# Patient Record
Sex: MALE | Race: BLACK OR AFRICAN AMERICAN
[De-identification: names, ages, dates, MRNs, and addresses within clinical notes are randomized per-mention and may not be internally consistent; named-entity substitution may affect disease eponyms.]

---
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
## 2017-03-08 NOTE — DIAGNOSTIC IMAGING REPORT
Indication: Pain



3 views of the left knee were obtained.



Findings:  There is joint space narrowing with marginal osteophyte formation 

and

subchondral sclerosis. Joint effusion is present. No fracture is identified.



Impression:



Joint effusion



Osteoarthritis

## 2017-03-08 NOTE — DIAGNOSTIC IMAGING REPORT
Indication: Chest Pain



Comparison:  2/21/17



A single view chest radiograph was obtained.



Findings:



Cardiomediastinal appearance is within normal limits for age.  Aorta is 

ectatic

consistent with atherosclerotic disease. Pulmonary vascularity is appropriate. 

The

diaphragmatic contour is smooth and costophrenic angles are sharp. No pleural

effusions are identified. The bones are unremarkable.



Impression: No acute findings

## 2017-03-11 NOTE — CARDIOLOGY REPORT
--------------- APPROVED REPORT --------------





EKG Measurement

Heart [id number]

MD [id number]

[id number]

[id number]

[id number]





Normal sinus rhythm

Normal ECG

## 2017-06-12 NOTE — EMERGENCY ROOM REPORT
History of Present Illness


General


Chief Complaint:  Hypertension


Source:  Patient





Present Illness


HPI


The patient is a 62-year-old male with history of hypertension presenting for 

high blood pressure.  He states that he took his blood pressure today which was 

1702/110s.  He states that he has been trying to reduce his blood pressure 

using supplements and has not been taking medications as prescribed.  He 

usually takes hydrochlorothiazide and amlodipine daily.  He did take his blood 

pressure medications today after seeing it was elevated.  He denies any other 

symptoms including N, V, F, chills, CP, SOB, dizziness, blurred vision, neck 

pain, abd pain


Allergies:  


Coded Allergies:  


     ATENOLOL (Verified  Allergy, Unknown, 2/21/17)


     LISINOPRIL (Verified  Allergy, Unknown, Itching, 9/13/12)





Patient History


Past Medical History:  see triage record


Pertinent Family History:  none


Reviewed Nursing Documentation:  PMH: Agreed, PSxH: Agreed





Nursing Documentation-PMH


Hx Cardiac Problems:  No


Hx Hypertension:  Yes


Hx Pacemaker:  No


Hx Asthma:  No


Hx COPD:  No


Hx Diabetes:  Yes - "borderline"


Hx Cancer:  No


Hx Gastrointestinal Problems:  No


Hx Dialysis:  No


Hx Neurological Problems:  No


Hx Cerebrovascular Accident:  No


Hx Seizures:  No





Review of Systems


All Other Systems:  negative except mentioned in HPI





Physical Exam





Vital Signs








  Date Time  Temp Pulse Resp B/P Pulse Ox O2 Delivery O2 Flow Rate FiO2


 


6/12/17 16:31 97.5 78 15 152/101 95 Room Air  








Sp02 EP Interpretation:  reviewed, normal


General Appearance:  no apparent distress, alert, GCS 15, non-toxic


Head:  normocephalic, atraumatic


Eyes:  bilateral eye PERRL, bilateral eye normal inspection


ENT:  hearing grossly normal, normal pharynx, no angioedema, normal voice


Neck:  full range of motion, supple/symm/no masses


Respiratory:  chest non-tender, lungs clear, normal breath sounds, speaking 

full sentences


Cardiovascular #1:  regular rate, rhythm, no edema


Musculoskeletal:  back normal, gait/station normal, normal range of motion, non-

tender


Neurologic:  alert, oriented x3, responsive, motor strength/tone normal, 

sensory intact, speech normal


Psychiatric:  judgement/insight normal, memory normal, mood/affect normal, no 

suicidal/homicidal ideation


Skin:  normal color, no rash, warm/dry, well hydrated





Medical Decision Making


PA Attestation


Dr. Parry is my supervising physician. Patient management was discussed with 

my supervising physician


Diagnostic Impression:  


 Primary Impression:  


 Hypertension


 Qualified Codes:  I10 - Essential (primary) hypertension


ER Course


The patient is a 62-year-old male presenting for hypertension





Differential diagnoses considered but not limited to: Hypertension, ACS, anxiety

, among others





Physical exam: The patient is hypertensive.  No apparent distress.


PERRL. EOMI. 


Normal mentation. 


RRR. No MRG


Lungs CTA bilat


Abdomen: Normal appearance. Non distended. No ecchymosis. 


Normal BS. Non TTP. No McBurney point tenderness. No guarding. 


Skin is warm and dry, no rashes. 





Blood work is unremarkable including cardiac markers


EKG unremarkable


Chest x-ray unremarkable





The patient is given clonidine and blood pressure has reduced





Patient is discharged home and is told to record blood pressure at home and 

take it to his primary doctor.  He will followup with his PMD.


ER precautions given





Laboratory Tests








Test


  6/12/17


17:05


 


White Blood Count


  6.6 K/UL


(4.8-10.8)


 


Red Blood Count


  4.37 M/UL


(4.70-6.10)  L


 


Hemoglobin


  14.1 G/DL


(14.2-18.0)  L


 


Hematocrit


  43.5 %


(42.0-52.0)


 


Mean Corpuscular Volume


  100 FL (80-99)


H


 


Mean Corpuscular Hemoglobin


  32.2 PG


(27.0-31.0)  H


 


Mean Corpuscular Hemoglobin


Concent 32.4 G/DL


(32.0-36.0)


 


Red Cell Distribution Width


  10.5 %


(11.6-14.8)  L


 


Platelet Count


  157 K/UL


(150-450)


 


Mean Platelet Volume


  8.3 FL


(6.5-10.1)


 


Neutrophils (%) (Auto)


  44.7 %


(45.0-75.0)  L


 


Lymphocytes (%) (Auto)


  39.4 %


(20.0-45.0)


 


Monocytes (%) (Auto)


  8.5 %


(1.0-10.0)


 


Eosinophils (%) (Auto)


  6.3 %


(0.0-3.0)  H


 


Basophils (%) (Auto)


  1.1 %


(0.0-2.0)


 


Sodium Level


  136 mEQ/L


(135-145)


 


Potassium Level


  3.9 mEQ/L


(3.4-4.9)


 


Chloride Level


  97 mEQ/L


()  L


 


Carbon Dioxide Level


  26 mEQ/L


(20-30)


 


Anion Gap 13 (5-15)  


 


Blood Urea Nitrogen


  17 mg/dL


(7-23)


 


Creatinine


  1.2 mg/dL


(0.7-1.2)


 


Estimate Glomerular


Filtration Rate > 60 mL/min


(>60)


 


Glucose Level


  119 mg/dL


()  H


 


Calcium Level


  10.7 mg/dL


(8.6-10.2)  H


 


Total Bilirubin


  0.4 mg/dL


(0.0-1.2)


 


Aspartate Amino Transferase


(AST) 20 U/L (5-40)  


 


 


Alanine Aminotransferase (ALT) 15 U/L (3-41)  


 


Alkaline Phosphatase


  93 U/L


()


 


Total Creatine Kinase


  200 U/L


()  H


 


Creatine Kinase MB


  3.2 ng/mL (<


6.7)


 


Creatine Kinase MB Relative


Index 1.6  


 


 


Troponin I


  < 0.30 ng/mL


(<=0.30)


 


Total Protein


  7.0 g/dL


(6.6-8.7)


 


Albumin


  3.9 g/dL


(3.5-5.2)


 


Globulin 3.1 g/dL  


 


Albumin/Globulin Ratio 1.2 (1.0-2.7)  








Lab Results Impression


Unremarkable


EKG Diagnostic Results


EP Interpretation:  NSR. Normal EKG


Rate:  normal - 74


Rhythm:  NSR


ST Segments:  no acute changes


ASA given to the pt in ED:  No


PA Scribe Text


EKG was reviewed and read with my supervising physician. No acute ST segment 

changes are seen. Normal rate and rhythm. No acute changes.





Chest X-Ray Diagnostic Results


Chest X-Ray Ordered:  Yes


# of Views/Limited/Complete:  1 View


Interpretation:  no consolidation, no effusion, no pneumothorax, no acute 

cardiopulmonary disease


Indication:  Other - HTN


Impression:  No acute disease


Date Electronically Signed:  Jun 12, 2017


Time Electronically Signed:  22:04


Interpreting ER Physician:  Dr. Brinda BLOCK Scribe Text


I am acting as scribe for my supervising physician. My supervising physician's 

interpretation of the chest xrays are there is no consolidation, no effusion, 

no acute cardiopulmonary disease, no pneumothorax





Last Vital Signs








  Date Time  Temp Pulse Resp B/P Pulse Ox O2 Delivery O2 Flow Rate FiO2


 


6/12/17 16:31 97.5 78 15 152/101 95 Room Air  








Status:  improved


Disposition:  HOME, SELF-CARE


Condition:  Improved


Scripts


Hydrochlorothiazide* (HYDROCHLOROTHIAZIDE*) 25 Mg Tablet


25 MG ORAL DAILY, #15 TAB


   Prov: ENRIQUE BROCK         6/12/17 


Amlodipine Besylate* (AMLODIPINE BESYLATE*) 10 Mg Tablet


10 MG ORAL DAILY, #15 TAB


   Prov: ENRIQUE BROCK         6/12/17











ENRIQUE BROCK Jun 12, 2017 16:46

## 2017-06-13 NOTE — DIAGNOSTIC IMAGING REPORT
Indications:  Shortness of breath



Technique:  Portable AP chest



Findings:



Comparison:  3/8/2017



Cardiac silhouette remains normal in size. Pulmonary vasculature remains within

normal limits. Lungs and pleura remain clear. Mild calcification and ectasia,

elongation of the aortic arch again noted.



IMPRESSION:



No evidence of acute disease, unchanged



Stable chronic changes as described

## 2017-06-14 NOTE — CARDIOLOGY REPORT
--------------- APPROVED REPORT --------------





EKG Measurement

Heart [id number]

MI [id number]

[id number]

[id number]

[id number]





Normal sinus rhythm

Normal ECG

## 2018-05-19 NOTE — EMERGENCY ROOM REPORT
History of Present Illness


General


Chief Complaint:  Male Urogenital Problems


Source:  Patient





Present Illness


HPI


Patient is a 63-year-old male who presented after increased hematuria.  The 

patient per having symptoms for the past few days associated with increased 

urinary frequency the patient denies any fever or flank pain.  The patient 

reports having some discoloration of his urine which was intermittent in 

nature.  He denies any prior history of renal stones.  The patient denies 

hesitancy with urination.Patient denies taking blood thinners or aspirin.


Allergies:  


Coded Allergies:  


     ATENOLOL (Verified  Allergy, Unknown, 2/21/17)


     LISINOPRIL (Verified  Allergy, Unknown, Itching, 9/13/12)





Patient History


Past Medical History:  see triage record


Reviewed Nursing Documentation:  PMH: Agreed; PSxH: Agreed





Nursing Documentation-PMH


Hx Cardiac Problems:  No


Hx Hypertension:  Yes


Hx Pacemaker:  No


Hx Asthma:  No


Hx COPD:  No


Hx Diabetes:  Yes - "borderline"


Hx Cancer:  No


Hx Gastrointestinal Problems:  No


Hx Dialysis:  No


Hx Neurological Problems:  No


Hx Cerebrovascular Accident:  No


Hx Seizures:  No





Review of Systems


All Other Systems:  negative except mentioned in HPI





Physical Exam





Vital Signs








  Date Time  Temp Pulse Resp B/P (MAP) Pulse Ox O2 Delivery O2 Flow Rate FiO2


 


5/19/18 16:24 98.6 98 20 152/96 99 Room Air  





 98.6       








Sp02 EP Interpretation:  reviewed, normal


General Appearance:  normal inspection, well appearing, no apparent distress, 

alert, GCS 15


Head:  atraumatic


ENT:  normal ENT inspection, hearing grossly normal, normal voice


Neck:  normal inspection, full range of motion, supple, no bony tend


Respiratory:  normal inspection, lungs clear, normal breath sounds, no 

respiratory distress, no retraction, no wheezing


Cardiovascular #1:  regular rate, rhythm, no edema


Gastrointestinal:  normal inspection, normal bowel sounds, non tender, soft, no 

guarding, no hernia


Genitourinary:  no CVA tenderness


Musculoskeletal:  normal inspection, back normal, normal range of motion


Neurologic:  normal inspection, alert, oriented x3, responsive, CNs III-XII nml 

as tested, speech normal


Psychiatric:  normal inspection, judgement/insight normal, mood/affect normal


Skin:  normal inspection, normal color, no rash





Medical Decision Making


Diagnostic Impression:  


 Primary Impression:  


 Urinary tract infection


ER Course


The patient presented for hematuria.  Differential diagnosis included was not 

limited to urinary tract infection, renal cell carcinoma, bladder CA 

pyelonephritis among others.The urinalysis showed evidence of a mild urinary 

infection.  Patient was given prescription for oral antibiotics as well as 

Pyridium.  The patient is advised to follow-up with urology for further 

evaluation of hematuria.The patient is advised to follow up with  primary care 

doctor in 1-2 days.  Patient is advised to return if any worsening condition or 

if any changes in status that are concerning.





This report is dictated with Dragon transcription software which may 

occasionally lead to discrepancies related to use of this software.





Labs








Test


  5/19/18


16:30


 


Urine Color Yellow 


 


Urine Appearance Clear 


 


Urine pH 7 (4.5-8.0) 


 


Urine Specific Gravity


  1.010


(1.005-1.035)


 


Urine Protein 1+ (NEGATIVE) 


 


Urine Glucose (UA)


  Negative


(NEGATIVE)


 


Urine Ketones


  Negative


(NEGATIVE)


 


Urine Occult Blood 5+ (NEGATIVE) 


 


Urine Nitrite


  Negative


(NEGATIVE)


 


Urine Bilirubin


  Negative


(NEGATIVE)


 


Urine Urobilinogen


  Normal MG/DL


(0.0-1.0)


 


Urine Leukocyte Esterase 1+ (NEGATIVE) 


 


Urine RBC


  5-10 /HPF (0 -


0)


 


Urine WBC


  2-4 /HPF (0 -


0)


 


Urine Squamous Epithelial


Cells None /LPF


(NONE/OCC)


 


Urine Bacteria


  Few /HPF


(NONE)











Last Vital Signs








  Date Time  Temp Pulse Resp B/P (MAP) Pulse Ox O2 Delivery O2 Flow Rate FiO2


 


5/19/18 16:39 98.6 98 20 152/96 99 Room Air  





 98.6       








Status:  improved


Disposition:  HOME, SELF-CARE


Condition:  Stable


Scripts


Phenazopyridine Hcl* (PYRIDIUM*) 200 Mg Tablet


200 MG ORAL THREE TIMES A DAY, #14 TAB 0 Refills


   Prov: Josh Leon MD         5/19/18 


Cephalexin* (KEFLEX*) 500 Mg Capsule


500 MG ORAL EVERY 6 HOURS, #28 CAP


   Prov: Josh Leon MD         5/19/18


Referrals:  


[facility],REFERRING (PCP)











Josh Leon MD May 19, 2018 17:05

## 2018-08-15 ENCOUNTER — HOSPITAL ENCOUNTER (EMERGENCY)
Dept: HOSPITAL 72 - EMR | Age: 63
Discharge: HOME | End: 2018-08-15
Payer: SELF-PAY

## 2018-08-15 VITALS — DIASTOLIC BLOOD PRESSURE: 105 MMHG | SYSTOLIC BLOOD PRESSURE: 164 MMHG

## 2018-08-15 VITALS — HEIGHT: 72 IN | BODY MASS INDEX: 32.51 KG/M2 | WEIGHT: 240 LBS

## 2018-08-15 VITALS — SYSTOLIC BLOOD PRESSURE: 144 MMHG | DIASTOLIC BLOOD PRESSURE: 99 MMHG

## 2018-08-15 DIAGNOSIS — R10.32: Primary | ICD-10-CM

## 2018-08-15 DIAGNOSIS — I10: ICD-10-CM

## 2018-08-15 DIAGNOSIS — Z88.8: ICD-10-CM

## 2018-08-15 DIAGNOSIS — R73.03: ICD-10-CM

## 2018-08-15 LAB
ADD MANUAL DIFF: NO
ALBUMIN SERPL-MCNC: 3.4 G/DL (ref 3.4–5)
ALBUMIN/GLOB SERPL: 0.9 {RATIO} (ref 1–2.7)
ALP SERPL-CCNC: 103 U/L (ref 46–116)
ALT SERPL-CCNC: 28 U/L (ref 12–78)
ANION GAP SERPL CALC-SCNC: 6 MMOL/L (ref 5–15)
APPEARANCE UR: CLEAR
APTT PPP: YELLOW S
AST SERPL-CCNC: 19 U/L (ref 15–37)
BASOPHILS NFR BLD AUTO: 1.5 % (ref 0–2)
BILIRUB SERPL-MCNC: 0.6 MG/DL (ref 0.2–1)
BUN SERPL-MCNC: 16 MG/DL (ref 7–18)
CALCIUM SERPL-MCNC: 10.6 MG/DL (ref 8.5–10.1)
CHLORIDE SERPL-SCNC: 106 MMOL/L (ref 98–107)
CO2 SERPL-SCNC: 28 MMOL/L (ref 21–32)
CREAT SERPL-MCNC: 1.4 MG/DL (ref 0.55–1.3)
EOSINOPHIL NFR BLD AUTO: 5.5 % (ref 0–3)
ERYTHROCYTE [DISTWIDTH] IN BLOOD BY AUTOMATED COUNT: 10.6 % (ref 11.6–14.8)
GLOBULIN SER-MCNC: 3.8 G/DL
GLUCOSE UR STRIP-MCNC: NEGATIVE MG/DL
HCT VFR BLD CALC: 41.2 % (ref 42–52)
HGB BLD-MCNC: 14.4 G/DL (ref 14.2–18)
KETONES UR QL STRIP: NEGATIVE
LEUKOCYTE ESTERASE UR QL STRIP: (no result)
LYMPHOCYTES NFR BLD AUTO: 41.8 % (ref 20–45)
MCV RBC AUTO: 97 FL (ref 80–99)
MONOCYTES NFR BLD AUTO: 6.4 % (ref 1–10)
NEUTROPHILS NFR BLD AUTO: 44.8 % (ref 45–75)
NITRITE UR QL STRIP: NEGATIVE
PH UR STRIP: 5 [PH] (ref 4.5–8)
PLATELET # BLD: 164 K/UL (ref 150–450)
POTASSIUM SERPL-SCNC: 4.2 MMOL/L (ref 3.5–5.1)
PROT UR QL STRIP: NEGATIVE
RBC # BLD AUTO: 4.23 M/UL (ref 4.7–6.1)
SODIUM SERPL-SCNC: 140 MMOL/L (ref 136–145)
SP GR UR STRIP: 1.02 (ref 1–1.03)
UROBILINOGEN UR-MCNC: NORMAL MG/DL (ref 0–1)
WBC # BLD AUTO: 6.9 K/UL (ref 4.8–10.8)

## 2018-08-15 PROCEDURE — 85025 COMPLETE CBC W/AUTO DIFF WBC: CPT

## 2018-08-15 PROCEDURE — 81003 URINALYSIS AUTO W/O SCOPE: CPT

## 2018-08-15 PROCEDURE — 36415 COLL VENOUS BLD VENIPUNCTURE: CPT

## 2018-08-15 PROCEDURE — 80053 COMPREHEN METABOLIC PANEL: CPT

## 2018-08-15 PROCEDURE — 83690 ASSAY OF LIPASE: CPT

## 2018-08-15 PROCEDURE — 99283 EMERGENCY DEPT VISIT LOW MDM: CPT

## 2018-08-15 NOTE — EMERGENCY ROOM REPORT
History of Present Illness


General


Chief Complaint:  Abdominal Pain


Source:  Patient





Present Illness


HPI


63-year-old male presents to the emergency department complaining of 2 out of 

10 in severity left lower quadrant abdominal pain 1 week.  Patient reports his 

symptoms are intermittent and feels that they're exacerbated an hour or so 

after eating.  Patient denies nausea, vomiting, fevers, chills, constipation or 

diarrhea.  Patient denies blood in the stool or black tarry stools.  Patient 

denies history of ulcers. Pt. reports he is able to pass flatulence. He reports 

intermittent bloating throughout the week.   Patient denies recent travel or 

ill contacts with similar symptoms.  Patient states that he is worried as he is 

told that he had "bad kidneys "in the past and wants to get that checked.  

Patient denies hematuria, dysuria, urinary frequency or anuria.  Patient 

reports history of high blood pressure.  He denies low back pain, chest pain, 

dizziness or syncope.


Allergies:  


Coded Allergies:  


     ATENOLOL (Verified  Allergy, Unknown, 2/21/17)


     LISINOPRIL (Verified  Allergy, Unknown, Itching, 9/13/12)





Patient History


Past Medical History:  see triage record


Past Surgical History:  none


Pertinent Family History:  none


Immunizations:  UTD


Reviewed Nursing Documentation:  PMH: Agreed; PSxH: Agreed





Nursing Documentation-PMH


Hx Cardiac Problems:  No


Hx Hypertension:  Yes


Hx Pacemaker:  No


Hx Asthma:  No


Hx COPD:  No


Hx Diabetes:  Yes - "borderline"


Hx Cancer:  No


Hx Gastrointestinal Problems:  No


Hx Dialysis:  No


Hx Neurological Problems:  No


Hx Cerebrovascular Accident:  No


Hx Seizures:  No





Review of Systems


All Other Systems:  negative except mentioned in HPI





Physical Exam





Vital Signs








  Date Time  Temp Pulse Resp B/P (MAP) Pulse Ox O2 Delivery O2 Flow Rate FiO2


 


8/15/18 16:29 98.4 86 14 164/105 94 Room Air  





 98.4       








Sp02 EP Interpretation:  reviewed, normal


General Appearance:  no apparent distress, alert, GCS 15, non-toxic


Head:  normocephalic, atraumatic


Eyes:  bilateral eye normal inspection, bilateral eye PERRL


ENT:  hearing grossly normal, normal voice


Neck:  full range of motion


Respiratory:  lungs clear, normal breath sounds, speaking full sentences


Cardiovascular #1:  regular rate, rhythm, no edema, normal capillary refill


Gastrointestinal:  normal bowel sounds, non tender, soft, non-distended, no 

guarding, no pulsatile mass


Rectal:  deferred


Genitourinary:  normal inspection, no CVA tenderness


Musculoskeletal:  back normal, gait/station normal, normal range of motion, non-

tender


Neurologic:  alert, oriented x3, responsive, motor strength/tone normal, 

sensory intact, speech normal, grossly normal


Psychiatric:  judgement/insight normal


Skin:  normal color, no rash, warm/dry, well hydrated





Medical Decision Making


PA Attestation


Dr. Leon  is my supervising Physician whom patient management has been 

discussed with.


Diagnostic Impression:  


 Primary Impression:  


 Abdominal pain


 Qualified Codes:  R10.32 - Left lower quadrant pain


ER Course


63-year-old male presents to the emergency department complaining of 2 out of 

10 in severity left lower quadrant abdominal pain 1 week.  Patient reports his 

symptoms are intermittent and feels that they're exacerbated an hour or so 

after eating.  Patient denies nausea, vomiting, fevers, chills, constipation or 

diarrhea.  Patient denies blood in the stool or black tarry stools.  Patient 

denies history of ulcers. Pt. reports he is able to pass flatulence. He reports 

intermittent bloating throughout the week.   Patient denies recent travel or 

ill contacts with similar symptoms.  Patient states that he is worried as he is 

told that he had "bad kidneys "in the past and wants to get that checked.  

Patient denies hematuria, dysuria, urinary frequency or anuria.  Patient 

reports history of high blood pressure.  He denies low back pain, chest pain, 

dizziness or syncope.





Ddx considered but are not limited to Diverticulitis, acute appy, diarrhea,UC, 

PUD, GE, pancreatitis, gallstone





Vital signs: are WNL, pt. is afebrile





H&PE are most consistent with abdominal pain not representative of acute 

abdomen.  Will  do basic lab-work. 





ORDERS: CBC, CMP, lipase, UA: all Unremarkable





ED INTERVENTIONS: 


- Simethicone PO





-I do not identify an emergent condition at this time. With current presentation

,  pt. is stable for close outpatient follow up and conservative treatment.  D/

w pt. to return promptly to ED with worsening or new symptoms.- Pt. (and or 

responsible party) verbalizes' understanding and agreement with proposed 

treatment plan.proposed treatment plan.  





DISCHARGE: At this time pt. is stable for d/c to home. Will provide printed 

patient care instructions, and any necessary prescriptions. Care plan and 

follow up instructions have been discussed with the patient prior to discharge.





Labs








Test


  8/15/18


17:16


 


White Blood Count


  6.9 K/UL


(4.8-10.8)


 


Red Blood Count


  4.23 M/UL


(4.70-6.10)


 


Hemoglobin


  14.4 G/DL


(14.2-18.0)


 


Hematocrit


  41.2 %


(42.0-52.0)


 


Mean Corpuscular Volume 97 FL (80-99) 


 


Mean Corpuscular Hemoglobin


  34.0 PG


(27.0-31.0)


 


Mean Corpuscular Hemoglobin


Concent 34.9 G/DL


(32.0-36.0)


 


Red Cell Distribution Width


  10.6 %


(11.6-14.8)


 


Platelet Count


  164 K/UL


(150-450)


 


Mean Platelet Volume


  8.0 FL


(6.5-10.1)


 


Neutrophils (%) (Auto)


  44.8 %


(45.0-75.0)


 


Lymphocytes (%) (Auto)


  41.8 %


(20.0-45.0)


 


Monocytes (%) (Auto)


  6.4 %


(1.0-10.0)


 


Eosinophils (%) (Auto)


  5.5 %


(0.0-3.0)


 


Basophils (%) (Auto)


  1.5 %


(0.0-2.0)


 


Urine Color Yellow 


 


Urine Appearance Clear 


 


Urine pH 5 (4.5-8.0) 


 


Urine Specific Gravity


  1.025


(1.005-1.035)


 


Urine Protein


  Negative


(NEGATIVE)


 


Urine Glucose (UA)


  Negative


(NEGATIVE)


 


Urine Ketones


  Negative


(NEGATIVE)


 


Urine Occult Blood


  Negative


(NEGATIVE)


 


Urine Nitrite


  Negative


(NEGATIVE)


 


Urine Bilirubin


  Negative


(NEGATIVE)


 


Urine Urobilinogen


  Normal MG/DL


(0.0-1.0)


 


Urine Leukocyte Esterase 1+ (NEGATIVE) 


 


Urine RBC


  0-2 /HPF (0 -


0)


 


Urine WBC


  2-4 /HPF (0 -


0)


 


Urine Squamous Epithelial


Cells None /LPF


(NONE/OCC)


 


Urine Bacteria


  Few /HPF


(NONE)


 


Sodium Level


  140 MMOL/L


(136-145)


 


Potassium Level


  4.2 MMOL/L


(3.5-5.1)


 


Chloride Level


  106 MMOL/L


()


 


Carbon Dioxide Level


  28 MMOL/L


(21-32)


 


Anion Gap


  6 mmol/L


(5-15)


 


Blood Urea Nitrogen


  16 mg/dL


(7-18)


 


Creatinine


  1.4 MG/DL


(0.55-1.30)


 


Estimat Glomerular Filtration


Rate > 60 mL/min


(>60)


 


Glucose Level


  144 MG/DL


()


 


Calcium Level


  10.6 MG/DL


(8.5-10.1)


 


Total Bilirubin


  0.6 MG/DL


(0.2-1.0)


 


Aspartate Amino Transf


(AST/SGOT) 19 U/L (15-37) 


 


 


Alanine Aminotransferase


(ALT/SGPT) 28 U/L (12-78) 


 


 


Alkaline Phosphatase


  103 U/L


()


 


Total Protein


  7.2 G/DL


(6.4-8.2)


 


Albumin


  3.4 G/DL


(3.4-5.0)


 


Globulin 3.8 g/dL 


 


Albumin/Globulin Ratio 0.9 (1.0-2.7) 


 


Lipase


  96 U/L


()











Last Vital Signs








  Date Time  Temp Pulse Resp B/P (MAP) Pulse Ox O2 Delivery O2 Flow Rate FiO2


 


8/15/18 16:30 98.4 86 14 164/105 94 Room Air  





 98.4       








Disposition:  HOME, SELF-CARE


Condition:  Stable


Scripts


Docusate Sodium* (COLACE*) 100 Mg Capsule


100 MG ORAL TWICE A DAY for 7 Days, #14 CAP


   Prov: Anila Hendricks         8/15/18 


Simethicone (Simethicone) 125 Mg Capsule


125 MG PO BID for 7 Days, #14 CAP


   Prov: Anila Hendricks         8/15/18


Referrals:  


NON PHYSICIAN (PCP)


Patient Instructions:  Abdominal Pain, Adult





Additional Instructions:  


Take medications as directed. 





 ** Follow up with a Primary Care Provider within 3 days of being discharged 

from the ED, even if your symptoms have resolved. ** 





--Please review list of primary care clinics, if you do not already have a 

primary care provider





Return sooner to ED if new symptoms occur, or current symptoms become worse. 








- Please note that this Emergency Department Report was dictated using Lumatic technology software, occasionally this can lead to 

erroneous entry secondary to interpretation by the dictation equipment.











Anila Hendricks Aug 15, 2018 18:33

## 2018-10-27 ENCOUNTER — HOSPITAL ENCOUNTER (EMERGENCY)
Dept: HOSPITAL 72 - EMR | Age: 63
Discharge: HOME | End: 2018-10-27
Payer: SELF-PAY

## 2018-10-27 VITALS — HEIGHT: 72 IN | BODY MASS INDEX: 31.83 KG/M2 | WEIGHT: 235 LBS

## 2018-10-27 VITALS — SYSTOLIC BLOOD PRESSURE: 136 MMHG | DIASTOLIC BLOOD PRESSURE: 86 MMHG

## 2018-10-27 VITALS — SYSTOLIC BLOOD PRESSURE: 165 MMHG | DIASTOLIC BLOOD PRESSURE: 110 MMHG

## 2018-10-27 DIAGNOSIS — M54.5: Primary | ICD-10-CM

## 2018-10-27 DIAGNOSIS — I10: ICD-10-CM

## 2018-10-27 DIAGNOSIS — E11.9: ICD-10-CM

## 2018-10-27 LAB
APPEARANCE UR: CLEAR
APTT PPP: YELLOW S
GLUCOSE UR STRIP-MCNC: NEGATIVE MG/DL
KETONES UR QL STRIP: (no result)
LEUKOCYTE ESTERASE UR QL STRIP: (no result)
NITRITE UR QL STRIP: NEGATIVE
PH UR STRIP: 5 [PH] (ref 4.5–8)
PROT UR QL STRIP: (no result)
SP GR UR STRIP: 1.03 (ref 1–1.03)
UROBILINOGEN UR-MCNC: 4 MG/DL (ref 0–1)

## 2018-10-27 PROCEDURE — 96374 THER/PROPH/DIAG INJ IV PUSH: CPT

## 2018-10-27 PROCEDURE — 99284 EMERGENCY DEPT VISIT MOD MDM: CPT

## 2018-10-27 PROCEDURE — 96372 THER/PROPH/DIAG INJ SC/IM: CPT

## 2018-10-27 PROCEDURE — 81003 URINALYSIS AUTO W/O SCOPE: CPT

## 2018-10-27 NOTE — EMERGENCY ROOM REPORT
History of Present Illness


General


Chief Complaint:  Back Pain-No Injury


Source:  Patient





Present Illness


HPI











Patient presents with complaints of pain and cramping to lower back


Patient reports that the initial injury occurred several days ago


He feels that he overexercised and caused exacerbation





Denies any other fall or trauma denies any chest pain pain is worse with 

movement better with rest





Patient rates the pain is 5 out of 10


Has not taking any other medications at home denies any neuropathy denies any 

decreased sensation


Denies any loss or control of bowel/urination


Allergies:  


Coded Allergies:  


     ATENOLOL (Verified  Allergy, Unknown, 2/21/17)


     LISINOPRIL (Verified  Allergy, Unknown, Itching, 9/13/12)





Patient History


Past Medical History:  see triage record


Pertinent Family History:  none


Reviewed Nursing Documentation:  PMH: Agreed; PSxH: Agreed





Nursing Documentation-PMH


Hx Cardiac Problems:  No


Hx Hypertension:  Yes


Hx Pacemaker:  No


Hx Asthma:  No


Hx COPD:  No


Hx Diabetes:  Yes


Hx Cancer:  No


Hx Gastrointestinal Problems:  No


Hx Dialysis:  No


Hx Neurological Problems:  No


Hx Cerebrovascular Accident:  No


Hx Seizures:  No





Review of Systems


All Other Systems:  negative except mentioned in HPI





Physical Exam





Vital Signs








  Date Time  Temp Pulse Resp B/P (MAP) Pulse Ox O2 Delivery O2 Flow Rate FiO2


 


10/27/18 01:14 97.9 88 18 165/110 98 Room Air  








Sp02 EP Interpretation:  reviewed, normal


General Appearance:  normal inspection, well appearing


Head:  normocephalic, atraumatic


Eyes:  bilateral eye PERRL, bilateral eye EOMI


ENT:  hearing grossly normal, normal pharynx


Neck:  full range of motion, supple


Respiratory:  lungs clear


Cardiovascular #1:  regular rate, rhythm


Gastrointestinal:  normal bowel sounds, non tender, soft


Genitourinary:  no CVA tenderness


Musculoskeletal:  other - Increased spasming across the lower L2, L3 region 

paraspinal, no midline step-offs


Neurologic:  alert, oriented x3, responsive


Skin:  normal color, no rash


Lymphatic:  no adenopathy





Medical Decision Making


Diagnostic Impression:  


 Primary Impression:  


 Back pain


ER Course


Given the patient's exam and findings appears to be fairly purely mechanical 

nature





Patient does not have any other focal neurological deficits





Denies any other fall or trauma imaging study has not been obtained initially


Patient has done significantly better with acute intervention





has hx of bph


And will have close outpatient follow-up





Last Vital Signs








  Date Time  Temp Pulse Resp B/P (MAP) Pulse Ox O2 Delivery O2 Flow Rate FiO2


 


10/27/18 01:14 97.9 88 18 165/110 98 Room Air  








Status:  improved


Disposition:  HOME, SELF-CARE


Condition:  Improved


Scripts


Methocarbamol* (ROBAXIN-750*) 750 Mg Tablet


750 MG PO TID, #21 TAB 0 Refills


   Prov: Eze Fitzgerald DO         10/27/18 


Ibuprofen* (MOTRIN*) 600 Mg Tablet


600 MG ORAL Q8H PRN for For Pain, #20 TAB 0 Refills


   Prov: Eze Fitzgerald DO         10/27/18 


Acetaminophen With Codeine (T#3) (TYLENOL #3 TAB*) Y Tab


1 TAB ORAL Q8H PRN for For Pain, #12 TAB


   Prov: Eze Fitzgerald DO         10/27/18


Patient Instructions:  Back Pain, Adult





Additional Instructions:  


Patient is provided with the discharge instructions notified to follow up with 

primary doctor in the next 2-3 days otherwise return to the er with any 

worsening symptoms.


Please note that this report is being documented using DRAGON technology.  This 

can lead to erroneous entry secondary to incorrect interpretation by the 

dictating instrument.











Eze Fitzgerald DO Oct 27, 2018 02:35

## 2020-04-17 ENCOUNTER — HOSPITAL ENCOUNTER (EMERGENCY)
Dept: HOSPITAL 72 - EMR | Age: 65
Discharge: HOME | End: 2020-04-17
Payer: MEDICARE

## 2020-04-17 VITALS — BODY MASS INDEX: 31.15 KG/M2 | HEIGHT: 72 IN | WEIGHT: 230 LBS

## 2020-04-17 VITALS — SYSTOLIC BLOOD PRESSURE: 161 MMHG | DIASTOLIC BLOOD PRESSURE: 127 MMHG

## 2020-04-17 DIAGNOSIS — D69.6: ICD-10-CM

## 2020-04-17 DIAGNOSIS — I10: ICD-10-CM

## 2020-04-17 DIAGNOSIS — Z88.8: ICD-10-CM

## 2020-04-17 DIAGNOSIS — E11.9: ICD-10-CM

## 2020-04-17 DIAGNOSIS — R00.2: Primary | ICD-10-CM

## 2020-04-17 LAB
ADD MANUAL DIFF: YES
ALBUMIN SERPL-MCNC: 3.8 G/DL (ref 3.4–5)
ALBUMIN/GLOB SERPL: 0.9 {RATIO} (ref 1–2.7)
ALP SERPL-CCNC: 130 U/L (ref 46–116)
ALT SERPL-CCNC: 23 U/L (ref 12–78)
ANION GAP SERPL CALC-SCNC: 6 MMOL/L (ref 5–15)
AST SERPL-CCNC: 35 U/L (ref 15–37)
BILIRUB SERPL-MCNC: 0.5 MG/DL (ref 0.2–1)
BUN SERPL-MCNC: 12 MG/DL (ref 7–18)
CALCIUM SERPL-MCNC: 10.8 MG/DL (ref 8.5–10.1)
CHLORIDE SERPL-SCNC: 102 MMOL/L (ref 98–107)
CO2 SERPL-SCNC: 31 MMOL/L (ref 21–32)
CREAT SERPL-MCNC: 1.1 MG/DL (ref 0.55–1.3)
ERYTHROCYTE [DISTWIDTH] IN BLOOD BY AUTOMATED COUNT: 12.4 % (ref 11.6–14.8)
GLOBULIN SER-MCNC: 4.2 G/DL
HCT VFR BLD CALC: 47.3 % (ref 42–52)
HGB BLD-MCNC: 14.9 G/DL (ref 14.2–18)
MCV RBC AUTO: 103 FL (ref 80–99)
PLATELET # BLD: 93 K/UL (ref 150–450)
POTASSIUM SERPL-SCNC: 5.1 MMOL/L (ref 3.5–5.1)
RBC # BLD AUTO: 4.6 M/UL (ref 4.7–6.1)
SODIUM SERPL-SCNC: 138 MMOL/L (ref 136–145)
WBC # BLD AUTO: 6.3 K/UL (ref 4.8–10.8)

## 2020-04-17 PROCEDURE — 85025 COMPLETE CBC W/AUTO DIFF WBC: CPT

## 2020-04-17 PROCEDURE — 93005 ELECTROCARDIOGRAM TRACING: CPT

## 2020-04-17 PROCEDURE — 99284 EMERGENCY DEPT VISIT MOD MDM: CPT

## 2020-04-17 PROCEDURE — 36415 COLL VENOUS BLD VENIPUNCTURE: CPT

## 2020-04-17 PROCEDURE — 71045 X-RAY EXAM CHEST 1 VIEW: CPT

## 2020-04-17 PROCEDURE — 84484 ASSAY OF TROPONIN QUANT: CPT

## 2020-04-17 PROCEDURE — 80053 COMPREHEN METABOLIC PANEL: CPT

## 2020-04-17 PROCEDURE — 85007 BL SMEAR W/DIFF WBC COUNT: CPT

## 2020-04-17 NOTE — NUR
ED Nurse Note:



Pt ambulated to ED from home d/t "irregular heartbeat since 4/15/2020. Pt 
denies any chest pain. Pt is AOx4, calm and cooperative. VSS, on RA, NAD. 
Placed on bed and gown; hooked to cardiac monitor. Will continue to monitor.

## 2020-04-17 NOTE — DIAGNOSTIC IMAGING REPORT
Indication: Chest

 

Technique: One view of the chest

 

Comparison: 6/12/2017

 

Findings: Patient is rotated to the right. The lungs and pleural spaces are clear.

The heart size is normal.

 

Impression: No acute process

## 2020-04-17 NOTE — EMERGENCY ROOM REPORT
History of Present Illness


General


Chief Complaint:  General Complaint


Source:  Patient





Present Illness


HPI


Patient presents with complaint of palpitation sensation ongoing for the past 2 

weeks


Does not associated with any movement or exertion denies any chest pain with it 

denies any shortness of breath





Denies any cough denies any weakness denies any fevers or chills patient 

reports that off-and-on he gets a palpitation sensation





And he was concerned and came to the emergency room denies any change in 

medications denies any other fall or trauma


Allergies:  


Coded Allergies:  


     ATENOLOL (Verified  Allergy, Unknown, 2/21/17)


     LISINOPRIL (Verified  Allergy, Unknown, Itching, 9/13/12)





COVID-19 Screening


Contact w/high risk pt:  No


Recent Travel to affected area:  No


Experienced COVID-19 symptoms?:  No





Patient History


Past Medical History:  see triage record


Reviewed Nursing Documentation:  PMH: Agreed; PSxH: Agreed





Nursing Documentation-PMH


Past Medical History:  No History, Except For


Hx Cardiac Problems:  No


Hx Hypertension:  Yes


Hx Pacemaker:  No


Hx Asthma:  No


Hx COPD:  No


Hx Diabetes:  Yes


Hx Cancer:  No


Hx Gastrointestinal Problems:  No


Hx Dialysis:  No


Hx Neurological Problems:  No


Hx Cerebrovascular Accident:  No


Hx Seizures:  No





Review of Systems


All Other Systems:  negative except mentioned in HPI





Physical Exam





Vital Signs








  Date Time  Temp Pulse Resp B/P (MAP) Pulse Ox O2 Delivery O2 Flow Rate FiO2


 


4/17/20 15:23 98.1 75 18 161/127 (138) 98 Room Air  








Sp02 EP Interpretation:  reviewed, normal


General Appearance:  well appearing, no apparent distress


Head:  normocephalic, atraumatic


Eyes:  bilateral eye PERRL, bilateral eye EOMI


ENT:  hearing grossly normal, normal pharynx, TMs + canals normal, uvula midline


Neck:  full range of motion, supple, no meningismus, no bony tend


Respiratory:  lungs clear, normal breath sounds, no rhonchi, no respiratory 

distress, no retraction, no accessory muscle use


Cardiovascular #1:  normal peripheral pulses, regular rate, rhythm, no edema, 

no gallop, no JVD, no murmur


Gastrointestinal:  normal bowel sounds, non tender, soft, no mass, no 

organomegaly, non-distended, no guarding, no hernia, no pulsatile mass, no 

rebound


Genitourinary:  no CVA tenderness


Musculoskeletal:  normal inspection


Neurologic:  motor strength/tone normal, CNs III-XII nml as tested, oriented x3

, sensory intact, responsive


Psychiatric:  mood/affect normal


Skin:  no rash


Lymphatic:  normal inspection, no adenopathy





Medical Decision Making


Diagnostic Impression:  


 Primary Impression:  


 palpitations


 Additional Impression:  


 thrombocytopenia


ER Course


Patient is a fairly complex patient with multiple differential to consideration 

including but not limited to cardiac cardiopulmonary and vascular emergencies


Patient's blood work shows mildly low platelet count this is different than his 

previous


Blood work besides that is otherwise appropriate with negative cardiac enzyme x-

ray was normal and EKG was appropriate patient remains essentially asymptomatic 

throughout his stay and at this time is stable for close outpatient follow-up





A copy of his labs were provided for him





And he will follow closely with his primary physician regarding the low 

platelet count





Labs








Test


  4/17/20


16:07


 


White Blood Count


  6.3 K/UL


(4.8-10.8)


 


Red Blood Count


  4.60 M/UL


(4.70-6.10)


 


Hemoglobin


  14.9 G/DL


(14.2-18.0)


 


Hematocrit


  47.3 %


(42.0-52.0)


 


Mean Corpuscular Volume 103 FL (80-99) 


 


Mean Corpuscular Hemoglobin


  32.3 PG


(27.0-31.0)


 


Mean Corpuscular Hemoglobin


Concent 31.4 G/DL


(32.0-36.0)


 


Red Cell Distribution Width


  12.4 %


(11.6-14.8)


 


Platelet Count


  93 K/UL


(150-450)


 


Mean Platelet Volume


  10.9 FL


(6.5-10.1)


 


Neutrophils (%) (Auto)  % (45.0-75.0) 


 


Lymphocytes (%) (Auto)  % (20.0-45.0) 


 


Monocytes (%) (Auto)  % (1.0-10.0) 


 


Eosinophils (%) (Auto)  % (0.0-3.0) 


 


Basophils (%) (Auto)  % (0.0-2.0) 


 


Differential Total Cells


Counted 100 


 


 


Neutrophils % (Manual) 43 % (45-75) 


 


Lymphocytes % (Manual) 44 % (20-45) 


 


Monocytes % (Manual) 11 % (1-10) 


 


Eosinophils % (Manual) 2 % (0-3) 


 


Basophils % (Manual) 0 % (0-2) 


 


Band Neutrophils 0 % (0-8) 


 


Platelet Estimate Decreased 


 


Platelet Morphology Normal 


 


Macrocytosis 1+ 


 


Sodium Level


  138 MMOL/L


(136-145)


 


Potassium Level


  5.1 MMOL/L


(3.5-5.1)


 


Chloride Level


  102 MMOL/L


()


 


Carbon Dioxide Level


  31 MMOL/L


(21-32)


 


Anion Gap


  6 mmol/L


(5-15)


 


Blood Urea Nitrogen


  12 mg/dL


(7-18)


 


Creatinine


  1.1 MG/DL


(0.55-1.30)


 


Estimat Glomerular Filtration


Rate > 60 mL/min


(>60)


 


Glucose Level


  114 MG/DL


()


 


Calcium Level


  10.8 MG/DL


(8.5-10.1)


 


Total Bilirubin


  0.5 MG/DL


(0.2-1.0)


 


Aspartate Amino Transf


(AST/SGOT) 35 U/L (15-37) 


 


 


Alanine Aminotransferase


(ALT/SGPT) 23 U/L (12-78) 


 


 


Alkaline Phosphatase


  130 U/L


()


 


Troponin I


  0.000 ng/mL


(0.000-0.056)


 


Total Protein


  8.0 G/DL


(6.4-8.2)


 


Albumin


  3.8 G/DL


(3.4-5.0)


 


Globulin 4.2 g/dL 


 


Albumin/Globulin Ratio 0.9 (1.0-2.7) 








EKG Diagnostic Results


Rate:  normal


Rhythm:  NSR


ST Segments:  no acute changes





Rhythm Strip Diag. Results


EP Interpretation:  yes


Rate:  77


Rhythm:  NSR, no PVC's, no ectopy





Chest X-Ray Diagnostic Results


Chest X-Ray Diagnostic Results :  


   Chest X-Ray Ordered:  Yes


   # of Views/Limited/Complete:  1 View


   Indication:  Chest Pain


   EP Interpretation:  Yes


   Interpretation:  no consolidation, no effusion, no pneumothorax


   Impression:  No acute disease


   Electronically Signed by:  Eze Fitzgerald DO





Last Vital Signs








  Date Time  Temp Pulse Resp B/P (MAP) Pulse Ox O2 Delivery O2 Flow Rate FiO2


 


4/17/20 16:28  75 18   Room Air  


 


4/17/20 16:28 98.1   161/127 98   








Status:  improved


Disposition:  HOME, SELF-CARE


Condition:  Improved


Scripts


No Active Prescriptions or Reported Meds


Referrals:  


PMD


Patient Instructions:  Palpitations, Easy-to-Read, Thrombocytopenia, Easy-to-

Read





Additional Instructions:  


Patient is provided with the discharge instructions notified to follow up with 

primary doctor in the next 2-3 days otherwise return to the er with any 

worsening symptoms.





Your platelets were somewhat low this appears to be different from your last 

blood check.  Please follow with your primary physician closely for evaluation


Please note that this report is being documented using DRAGON technology.  This 

can lead to erroneous entry secondary to incorrect interpretation by the 

dictating instrument.











Eze Fitzgerald DO Apr 17, 2020 17:19

## 2020-11-14 ENCOUNTER — HOSPITAL ENCOUNTER (EMERGENCY)
Dept: HOSPITAL 72 - EMR | Age: 65
Discharge: OTHER INSTITUTION | End: 2020-11-14
Payer: MEDICARE

## 2020-11-14 VITALS — WEIGHT: 230 LBS | HEIGHT: 71 IN | BODY MASS INDEX: 32.2 KG/M2

## 2020-11-14 VITALS — SYSTOLIC BLOOD PRESSURE: 138 MMHG | DIASTOLIC BLOOD PRESSURE: 83 MMHG

## 2020-11-14 VITALS — DIASTOLIC BLOOD PRESSURE: 79 MMHG | SYSTOLIC BLOOD PRESSURE: 158 MMHG

## 2020-11-14 VITALS — DIASTOLIC BLOOD PRESSURE: 114 MMHG | SYSTOLIC BLOOD PRESSURE: 182 MMHG

## 2020-11-14 VITALS — DIASTOLIC BLOOD PRESSURE: 72 MMHG | SYSTOLIC BLOOD PRESSURE: 142 MMHG

## 2020-11-14 VITALS — DIASTOLIC BLOOD PRESSURE: 84 MMHG | SYSTOLIC BLOOD PRESSURE: 156 MMHG

## 2020-11-14 VITALS — DIASTOLIC BLOOD PRESSURE: 90 MMHG | SYSTOLIC BLOOD PRESSURE: 142 MMHG

## 2020-11-14 DIAGNOSIS — Z88.8: ICD-10-CM

## 2020-11-14 DIAGNOSIS — E11.9: ICD-10-CM

## 2020-11-14 DIAGNOSIS — I10: ICD-10-CM

## 2020-11-14 DIAGNOSIS — R42: ICD-10-CM

## 2020-11-14 DIAGNOSIS — G45.9: Primary | ICD-10-CM

## 2020-11-14 LAB
ADD MANUAL DIFF: NO
ALBUMIN SERPL-MCNC: 3.5 G/DL (ref 3.4–5)
ALBUMIN/GLOB SERPL: 0.9 {RATIO} (ref 1–2.7)
ALP SERPL-CCNC: 119 U/L (ref 46–116)
ALT SERPL-CCNC: 23 U/L (ref 12–78)
ANION GAP SERPL CALC-SCNC: 4 MMOL/L (ref 5–15)
APPEARANCE UR: (no result)
APTT BLD: 23 SEC (ref 23–33)
APTT PPP: (no result) S
AST SERPL-CCNC: 17 U/L (ref 15–37)
BASOPHILS NFR BLD AUTO: 0.5 % (ref 0–2)
BILIRUB SERPL-MCNC: 0.6 MG/DL (ref 0.2–1)
BUN SERPL-MCNC: 11 MG/DL (ref 7–18)
CALCIUM SERPL-MCNC: 10.3 MG/DL (ref 8.5–10.1)
CHLORIDE SERPL-SCNC: 104 MMOL/L (ref 98–107)
CHOLEST SERPL-MCNC: 190 MG/DL (ref ?–200)
CO2 SERPL-SCNC: 30 MMOL/L (ref 21–32)
CREAT SERPL-MCNC: 1.4 MG/DL (ref 0.55–1.3)
EOSINOPHIL NFR BLD AUTO: 4.8 % (ref 0–3)
ERYTHROCYTE [DISTWIDTH] IN BLOOD BY AUTOMATED COUNT: 11 % (ref 11.6–14.8)
GLOBULIN SER-MCNC: 3.8 G/DL
GLUCOSE UR STRIP-MCNC: NEGATIVE MG/DL
HCT VFR BLD CALC: 44.7 % (ref 42–52)
HDLC SERPL-MCNC: 66 MG/DL (ref 40–60)
HGB BLD-MCNC: 14.8 G/DL (ref 14.2–18)
INR PPP: 1 (ref 0.9–1.1)
KETONES UR QL STRIP: NEGATIVE
LEUKOCYTE ESTERASE UR QL STRIP: NEGATIVE
LYMPHOCYTES NFR BLD AUTO: 28.5 % (ref 20–45)
MCV RBC AUTO: 103 FL (ref 80–99)
MONOCYTES NFR BLD AUTO: 8 % (ref 1–10)
NEUTROPHILS NFR BLD AUTO: 58.1 % (ref 45–75)
NITRITE UR QL STRIP: NEGATIVE
PH UR STRIP: 6.5 [PH] (ref 4.5–8)
PLATELET # BLD: 151 K/UL (ref 150–450)
POTASSIUM SERPL-SCNC: 3.8 MMOL/L (ref 3.5–5.1)
PROT UR QL STRIP: (no result)
RBC # BLD AUTO: 4.36 M/UL (ref 4.7–6.1)
SODIUM SERPL-SCNC: 138 MMOL/L (ref 136–145)
SP GR UR STRIP: 1.01 (ref 1–1.03)
TRIGL SERPL-MCNC: 49 MG/DL (ref 30–150)
UROBILINOGEN UR-MCNC: NORMAL MG/DL (ref 0–1)
WBC # BLD AUTO: 5.6 K/UL (ref 4.8–10.8)

## 2020-11-14 PROCEDURE — 80307 DRUG TEST PRSMV CHEM ANLYZR: CPT

## 2020-11-14 PROCEDURE — 99291 CRITICAL CARE FIRST HOUR: CPT

## 2020-11-14 PROCEDURE — 96360 HYDRATION IV INFUSION INIT: CPT

## 2020-11-14 PROCEDURE — 80053 COMPREHEN METABOLIC PANEL: CPT

## 2020-11-14 PROCEDURE — 71045 X-RAY EXAM CHEST 1 VIEW: CPT

## 2020-11-14 PROCEDURE — 70450 CT HEAD/BRAIN W/O DYE: CPT

## 2020-11-14 PROCEDURE — 85025 COMPLETE CBC W/AUTO DIFF WBC: CPT

## 2020-11-14 PROCEDURE — 81003 URINALYSIS AUTO W/O SCOPE: CPT

## 2020-11-14 PROCEDURE — 93005 ELECTROCARDIOGRAM TRACING: CPT

## 2020-11-14 PROCEDURE — 85730 THROMBOPLASTIN TIME PARTIAL: CPT

## 2020-11-14 PROCEDURE — 36415 COLL VENOUS BLD VENIPUNCTURE: CPT

## 2020-11-14 PROCEDURE — 85610 PROTHROMBIN TIME: CPT

## 2020-11-14 PROCEDURE — 80061 LIPID PANEL: CPT

## 2020-11-14 PROCEDURE — 84484 ASSAY OF TROPONIN QUANT: CPT

## 2020-11-14 NOTE — NUR
ED Nurse Note: pt BIB girlfriend from home c/o dizziness and slurred speech, 
per girlfriend pt had "stroke like symptoms." Pt denies loss of conciousness, 
Pt is AAOx4, breathing even and unlabored, Sinus bradycardia on the monitor HR 
47-52, Hypertensive 158/79, other vital signs stable as documented.

## 2020-11-14 NOTE — HISTORY & PHYSICAL
History of Present Illness


General


Date patient seen:  Nov 14, 2020


Time patient seen:  12:00


Reason for Hospitalization:  Dizziness





Present Illness


HPI


Patient is a 65-year-old male past medical history of hypertension who was 

brought in by his girlfriend for weakness.  History is very unclear and I have 

spoke with the patient and his girlfriend to try to obtain an accurate story.  

Patient states that he suffers from incontinence.  He wakes up frequently to use

the bathroom.  Patient's girlfriend states they went to bed at 9 PM last night 

and that the patient was normal.  She states that he woke up several times in 

the middle of the night to use his urinal.  She states that at approximately 

4:41 AM he got up to have a bowel movement.  She states that she helped him back

to bed and noted some slurred speech.  She states the patient had complained of 

dizziness.  She states that patient has difficulty walking chronically due to 

left knee pain which is unchanged.  She states that she noted that he was weak 

all over but did not note any focal weakness.  Patient is alert and oriented x3.

 He states that he was brought in because he felt weak all over his body.  He 

denies having dizziness or slurred speech.  Patient does state that he has 

chronic left knee pain and has some sort of a muscle issue in his right groin.  

He denies having headache or blurry vision.  He denies having chest pain or 

shortness of breath.  He denies having any focal weakness or strokelike 

symptoms.  He does not remember the same story that the girlfriend gave.  They 

were driven here by a friend.  Patient is not on any blood thinners or aspirin.


Allergies:  


Coded Allergies:  


     ATENOLOL (Verified  Allergy, Unknown, 2/21/17)


     LISINOPRIL (Verified  Allergy, Unknown, Itching, 9/13/12)





COVID-19 Screening


Contact w/high risk pt:  No


Recent Travel to affected area:  No


Experienced COVID-19 symptoms?:  No





Medication History


Scheduled


Amlodipine Besylate* (Amlodipine Besylate*), 10 MG ORAL DAILY, (Reported)





Patient History


Limited by:  medical condition


Healthcare decision maker





Resuscitation status





Advanced Directive on File








Review of Systems


Review of Symptoms


General ROS: no weight loss or fever


Psychological ROS: no depression or mood changes, no memory loss


Ophthalmic ROS: no visual changes or eye irritation


ENT ROS: no nasal congestion, hearing loss, dizziness


Allergy and Immunology ROS: no allergic symptoms or urticaria


Hematological and Lymphatic ROS: no swollen glands, unusual bleeding or bruising


Endocrine ROS: no polyuria, polydipsia, weight changes, temperature intolerance


Respiratory ROS: no cough, shortness of breath, or wheezing


Cardiovascular ROS: no chest pain or dyspnea on exertion


Gastrointestinal ROS: denies abdominal pain, bright red blood in stool.


Musculoskeletal ROS: no myalgias or arthralgias


Neurological ROS: no TIA or stroke symptoms


Dermatological ROS: no new or changing skin lesions, rashes or pruritis





Physical Exam


Physical Exam


General appearance:  alert, cooperative, no distress, appears stated age


Head:  Normocephalic, without obvious abnormality, atraumatic


Eyes:  conjunctivae/corneas clear. PERRL, EOM's intact. Fundi benign


Throat:  Lips, mucosa, and tongue normal. Teeth and gums normal


Neck:  supple, symmetrical, trachea midline, no adenopathy, thyroid: not 

enlarged, symmetric, no tenderness/mass/nodules, no carotid bruit and no JVD


Lungs:  clear to auscultation bilaterally


Heart:  regular rate and rhythm, S1, S2 normal, no murmur, click, rub or gallop


Abdomen:  soft, non-tender. Bowel sounds normal. No masses,  no organomegaly


Extremities:  extremities normal, atraumatic, no cyanosis or edema


Pulses:  2+ and symmetric


Skin:  Skin color, texture, turgor normal. No rashes or lesions


Neurologic:  Grossly normal





Last 24 Hour Vital Signs








  Date Time  Temp Pulse Resp B/P (MAP) Pulse Ox O2 Delivery O2 Flow Rate FiO2


 


11/14/20 14:47 98.5 76 14 182/114 100 Room Air  


 


11/14/20 13:35 98.5 76 16 156/84 99 Room Air  


 


11/14/20 11:32 98.2 73 15 142/90 97 Room Air  


 


11/14/20 09:32 98.5 76 19 138/83 100 Room Air  


 


11/14/20 07:30 98.2 65 18 142/72 99 Room Air  


 


11/14/20 05:55 98.0 56 16 158/79 98 Room Air  


 


11/14/20 05:55  56 18     


 


11/14/20 05:52 98.2 76 16 150/100 (117) 98 Room Air  

















Intake and Output  


 


 11/13/20 11/14/20





 19:00 07:00


 


  


 


# Voids  1











Laboratory Tests








Test


 11/14/20


06:10 11/14/20


06:21


 


White Blood Count


 5.6 K/UL


(4.8-10.8) 





 


Red Blood Count


 4.36 M/UL


(4.70-6.10)  L 





 


Hemoglobin


 14.8 G/DL


(14.2-18.0) 





 


Hematocrit


 44.7 %


(42.0-52.0) 





 


Mean Corpuscular Volume


 103 FL (80-99)


H 





 


Mean Corpuscular Hemoglobin


 33.8 PG


(27.0-31.0)  H 





 


Mean Corpuscular Hemoglobin


Concent 33.0 G/DL


(32.0-36.0) 





 


Red Cell Distribution Width


 11.0 %


(11.6-14.8)  L 





 


Platelet Count


 151 K/UL


(150-450) 





 


Mean Platelet Volume


 8.8 FL


(6.5-10.1) 





 


Neutrophils (%) (Auto)


 58.1 %


(45.0-75.0) 





 


Lymphocytes (%) (Auto)


 28.5 %


(20.0-45.0) 





 


Monocytes (%) (Auto)


 8.0 %


(1.0-10.0) 





 


Eosinophils (%) (Auto)


 4.8 %


(0.0-3.0)  H 





 


Basophils (%) (Auto)


 0.5 %


(0.0-2.0) 





 


Prothrombin Time


 10.9 SEC


(9.30-11.50) 





 


Prothromb Time International


Ratio 1.0 (0.9-1.1)  


 





 


Activated Partial


Thromboplast Time 23 SEC (23-33)


 





 


Sodium Level


 138 MMOL/L


(136-145) 





 


Potassium Level


 3.8 MMOL/L


(3.5-5.1) 





 


Chloride Level


 104 MMOL/L


() 





 


Carbon Dioxide Level


 30 MMOL/L


(21-32) 





 


Anion Gap


 4 mmol/L


(5-15)  L 





 


Blood Urea Nitrogen


 11 mg/dL


(7-18) 





 


Creatinine


 1.4 MG/DL


(0.55-1.30)  H 





 


Estimat Glomerular Filtration


Rate > 60 mL/min


(>60) 





 


Glucose Level


 155 MG/DL


()  H 





 


Calcium Level


 10.3 MG/DL


(8.5-10.1)  H 





 


Total Bilirubin


 0.6 MG/DL


(0.2-1.0) 





 


Aspartate Amino Transf


(AST/SGOT) 17 U/L (15-37)


 





 


Alanine Aminotransferase


(ALT/SGPT) 23 U/L (12-78)


 





 


Alkaline Phosphatase


 119 U/L


()  H 





 


Troponin I


 0.009 ng/mL


(0.000-0.056) 





 


Total Protein


 7.3 G/DL


(6.4-8.2) 





 


Albumin


 3.5 G/DL


(3.4-5.0) 





 


Globulin 3.8 g/dL   


 


Albumin/Globulin Ratio


 0.9 (1.0-2.7)


L 





 


Triglycerides Level


 49 MG/DL


() 





 


Cholesterol Level


 190 MG/DL (<


200) 





 


LDL Cholesterol


 111 mg/dL


(<100)  H 





 


HDL Cholesterol


 66 MG/DL


(40-60)  H 





 


Cholesterol/HDL Ratio


 2.9 (3.3-4.4)


L 





 


Urine Opiates Screen


 Negative


(NEGATIVE) 





 


Urine Barbiturates Screen


 Negative


(NEGATIVE) 





 


Phencyclidine (PCP) Screen


 Negative


(NEGATIVE) 





 


Urine Amphetamines Screen


 Negative


(NEGATIVE) 





 


Urine Benzodiazepines Screen


 Negative


(NEGATIVE) 





 


Urine Cocaine Screen


 Negative


(NEGATIVE) 





 


Urine Marijuana (THC) Screen


 Negative


(NEGATIVE) 





 


Urine Color  Pale yellow  


 


Urine Appearance


 


 Slightly


cloudy


 


Urine pH  6.5 (4.5-8.0)  


 


Urine Specific Gravity


 


 1.015


(1.005-1.035)


 


Urine Protein


 


 1+ (NEGATIVE)


H


 


Urine Glucose (UA)


 


 Negative


(NEGATIVE)


 


Urine Ketones


 


 Negative


(NEGATIVE)


 


Urine Blood


 


 Negative


(NEGATIVE)


 


Urine Nitrite


 


 Negative


(NEGATIVE)


 


Urine Bilirubin


 


 Negative


(NEGATIVE)


 


Urine Urobilinogen


 


 Normal MG/DL


(0.0-1.0)


 


Urine Leukocyte Esterase


 


 Negative


(NEGATIVE)


 


Urine RBC


 


 0 /HPF (0 - 0)





 


Urine WBC


 


 0 /HPF (0 - 0)





 


Urine Squamous Epithelial


Cells 


 Occasional


/LPF


 


Urine Amorphous Sediment


 


 Few /LPF


(NONE)  H


 


Urine Bacteria


 


 Occasional


/HPF (NONE)











Microbiology








 Date/Time


Source Procedure


Growth Status





 


 11/14/20 07:26


Nasopharynx SARS-CoV-2 RdRp Gene Assay - Final Complete








Height (Feet):  5


Height (Inches):  11.00


Weight (Pounds):  230





Assessment/Plan


Status Narrative


Diagnostic Impression:  


   Primary Impression:  


   Dizziness


   Additional Impression:  


   TIA (transient ischemic attack)








Patient is very unclear of the history as is his girlfriend.  Very difficult to 

follow the exact reasoning for his ER visit.    CT without contrast demonstrates

mild encephalomalacia in the left frontal lobe consistent with old infarct.  No 

evidence for intracranial hemorrhage.  I Patient is not a TPA candidate as it 

has been greater than 6 hours since last known normal.





Patient's labs demonstrate no significant acute abnormalities.  Patient's 

history is very unclear.  Per girlfriend patient did have slurred speech but 

patient denies having that he also denies having dizziness which she states he 

had.  Patient refusing CTs with contrast.  Patient will need MRIs and further 

TIA work-up.  Patient will be transferred to [facility] for further 

treatment and evaluation.





[facility]


Hospital declaration


  INPATIENT level of care is warranted for this patient because patient is a 89 

year old with *** who presents with suspicion of ***. I have a high level of c

oncern because ***. Patient is at high risk for ***. Plan of care/treatment 

include ***. Patient care is expected to be greater than 2 midnights.  





  OBSERVATION level of care is warranted for this patient. Patient is a 89 year 

old with *** who presents with ***. Patient will be admitted for 1 midnight, but

if additional night(s) is/are necessary, patient will be converted to inpatient 

status for the entire hospitalization





Disposition: Once the patient is stable to leave the hospital, I anticipate the 

patient will likely be discharged to the following environment:***





Estimated discharge date: ***





I spent 70 minutes on this patient's case, and *** minutes was dedicated to 

counseling and/or care coordination. 








MIPS ([employer]centive Payment System) 


Applicable CPT: 16163, 61557





CHECK ALL THAT ARE MET:





  Measure #5 (CHF): All ages. Prescribe ACE/ARB upon discharge for patients with

left ventricular systolic dysfunction. If not, the reason is clearly documented 

in the medical chart.





  Measure #8 (CHF): All ages. Prescribe a beta blocker upon discharge for 

patients with left ventricular systolic dysfunction. If not, the reason is 

clearly documented in the medical chart.





  Measure #47 Advance care plan or surrogate decision maker documented in the 

medical record. 





  Measure #130 The provider has documented, updated, or reviewed the patients 

current medication list and has documented it in the patients note.  





  Measure #374 (All): Send report to referring provider. 





  Measure #407(Sepsis due to MSSA bacteremia): Age 18+ Patient treated with a 

beta-lactam antibiotic (Nafcillin, Oxacillin or Cefazolin) as definitive 

therapy. 








MEDICAL COMPLEXITY


High complexity medical decision making (need 2/3 categories)





Problem - need 4 points


  Acute/new problem with new plan for workup (4 points, 1 max)


  Acute/new problem without additional workup (3 points, 1 max)


  Unstable chronic problem actively being managed (2 point each, 2 max)


  Stable chronic problem actively being managed (1 point each, 2 max)


  Self-limited/transient process (constipation, muscle ache, etc) (1 point each,

2 max)


 





Data - need 4 points


  Reviewed labs/imaging studies (1 points, 2 max)


  Independent review of imaging (EKG, xrays, etc) (2 points, 2 max)


  Discussed case with consult/other MD/RN (2 points, 2 max)





High Risk - qualify if have one of the following:


  Severe exacerbation of acute problem, acute mental status change, IV 

narcotics, monitoring drug levels (vancomycin, INR, tacrolimus etc)











Ivan Paul MD           Nov 14, 2020 15:40

## 2020-11-14 NOTE — DIAGNOSTIC IMAGING REPORT
EXAM:

  CT Head Without Intravenous Contrast

 

CLINICAL HISTORY:

  CVA

 

TECHNIQUE:

  Axial computed tomography images of the head/brain without intravenous 

contrast.  CTDI is 53.4 mGy and DLP is 1018.8 mGy-cm.  One or more of the 

following dose reduction techniques were used: automated exposure control,

 adjustment of the mA and/or kV according to patient size, use of 

iterative reconstruction technique.

 

COMPARISON:

  No relevant prior studies available.

 

FINDINGS:

No acute intracranial hemorrhage.  No midline shift or mass effect. 

 

Mild encephalomalacia in the left frontal lobe, consistent with old 

infarct.  

 

Age-related cerebral volume loss.  Periventricular and subcortical white 

matter hypoattenuation, consistent with chronic microangiopathy. 

 

Dehiscent right lamina papyracea. 

 

The visualized paranasal sinuses and mastoid air cells are grossly clear.

 

IMPRESSION:

No acute intracranial hemorrhage, midline shift, or mass effect.

 

Mild encephalomalacia in the left frontal lobe, consistent with old 

infarct.  

 

Age-related cerebral volume loss.  Periventricular and subcortical white 

matter hypoattenuation, consistent with chronic microangiopathy.

## 2020-11-14 NOTE — NUR
ED Nurse Note:



Report given to Rian ANDERSON of [facility] telemetry unit. Pt 
transferred by ALS with all his belongings sent. /114 and Primary RN and 
charge nurse okay to transfer patient.

## 2020-11-14 NOTE — DIAGNOSTIC IMAGING REPORT
EXAM:

  XR Chest, 1 View

 

CLINICAL HISTORY:

  WEAK

 

TECHNIQUE:

  Frontal view of the chest.

 

COMPARISON:

  April 17, 2020.

 

FINDINGS:

  Lungs:  Unremarkable.  No consolidation.

  Pleural space:  Unremarkable.  No pneumothorax.

  Heart:  Unremarkable.  No cardiomegaly.

  Mediastinum: Tortuous/ectatic thoracic aorta, similar to prior.

  Bones/joints: Degenerative changes in thoracic spine.

 

IMPRESSION:     

  No acute cardiopulmonary process.

## 2020-11-15 NOTE — CARDIOLOGY REPORT
--------------- APPROVED REPORT --------------





EKG Measurement

Heart [id number]

HI [id number]

[id number]

[id number]

[id number]



<Conclusion>

Normal sinus rhythm

Normal ECG

## 2021-10-07 ENCOUNTER — HOSPITAL ENCOUNTER (EMERGENCY)
Dept: HOSPITAL 87 - ER | Age: 66
LOS: 1 days | Discharge: HOME | End: 2021-10-08
Payer: MEDICARE

## 2021-10-07 VITALS — HEIGHT: 72 IN | BODY MASS INDEX: 31.35 KG/M2 | WEIGHT: 231.49 LBS

## 2021-10-07 DIAGNOSIS — Z86.73: ICD-10-CM

## 2021-10-07 DIAGNOSIS — E11.9: ICD-10-CM

## 2021-10-07 DIAGNOSIS — M25.519: ICD-10-CM

## 2021-10-07 DIAGNOSIS — I10: ICD-10-CM

## 2021-10-07 DIAGNOSIS — Z88.8: ICD-10-CM

## 2021-10-07 DIAGNOSIS — M54.2: Primary | ICD-10-CM

## 2021-10-07 PROCEDURE — 99285 EMERGENCY DEPT VISIT HI MDM: CPT

## 2021-10-07 PROCEDURE — 93005 ELECTROCARDIOGRAM TRACING: CPT

## 2021-10-07 PROCEDURE — 81003 URINALYSIS AUTO W/O SCOPE: CPT

## 2021-10-07 PROCEDURE — 71045 X-RAY EXAM CHEST 1 VIEW: CPT

## 2021-10-07 PROCEDURE — 36415 COLL VENOUS BLD VENIPUNCTURE: CPT

## 2021-10-07 PROCEDURE — 80053 COMPREHEN METABOLIC PANEL: CPT

## 2021-10-07 PROCEDURE — 84484 ASSAY OF TROPONIN QUANT: CPT

## 2021-10-07 PROCEDURE — 85025 COMPLETE CBC W/AUTO DIFF WBC: CPT

## 2021-10-08 VITALS — SYSTOLIC BLOOD PRESSURE: 149 MMHG | DIASTOLIC BLOOD PRESSURE: 94 MMHG

## 2021-10-08 LAB
APPEARANCE UR: CLEAR
BASOPHILS NFR BLD AUTO: 0.2 % (ref 0–2)
CHLORIDE SERPL-SCNC: 109 MEQ/L (ref 98–107)
COLOR UR: YELLOW
EOSINOPHIL NFR BLD AUTO: 5.3 % (ref 0–5)
ERYTHROCYTE [DISTWIDTH] IN BLOOD BY AUTOMATED COUNT: 12.5 % (ref 11.6–14.6)
HCT VFR BLD AUTO: 39.2 % (ref 42–52)
HGB BLD-MCNC: 13.2 G/DL (ref 14–18)
HGB UR QL STRIP: NEGATIVE
KETONES UR STRIP-MCNC: NEGATIVE MG/DL
LEUKOCYTE ESTERASE UR QL STRIP: NEGATIVE
LYMPHOCYTES NFR BLD AUTO: 29.3 % (ref 20–50)
MCH RBC QN AUTO: 33.6 PG (ref 28–32)
MCV RBC AUTO: 99.3 FL (ref 80–94)
MONOCYTES NFR BLD AUTO: 10.1 % (ref 2–8)
NEUTROPHILS NFR BLD AUTO: 55.1 % (ref 40–76)
NITRITE UR QL STRIP: NEGATIVE
PH UR STRIP: 7 [PH] (ref 4.5–8)
PLATELET # BLD AUTO: 152 X1000/UL (ref 130–400)
PMV BLD AUTO: 10 FL (ref 7.4–10.4)
PROT UR QL STRIP: NEGATIVE
RBC # BLD AUTO: 3.95 MILL/UL (ref 4.7–6.1)
SP GR UR STRIP: 1.01 (ref 1–1.03)
UROBILINOGEN UR STRIP-MCNC: 0.2 E.U./DL (ref 0.2–1)

## 2023-06-12 ENCOUNTER — HOSPITAL ENCOUNTER (EMERGENCY)
Dept: HOSPITAL 87 - ER | Age: 68
Discharge: LEFT BEFORE DISCHARGE | End: 2023-06-12
Payer: MEDICARE

## 2023-06-12 VITALS — WEIGHT: 224.87 LBS | HEIGHT: 72 IN | BODY MASS INDEX: 30.46 KG/M2

## 2023-06-12 VITALS — SYSTOLIC BLOOD PRESSURE: 134 MMHG | DIASTOLIC BLOOD PRESSURE: 94 MMHG

## 2023-06-12 DIAGNOSIS — I10: Primary | ICD-10-CM

## 2023-06-12 DIAGNOSIS — Z53.21: ICD-10-CM

## 2023-06-12 PROCEDURE — 99281 EMR DPT VST MAYX REQ PHY/QHP: CPT

## 2025-04-15 ENCOUNTER — HOSPITAL ENCOUNTER (EMERGENCY)
Dept: HOSPITAL 87 - ER | Age: 70
Discharge: HOME | End: 2025-04-15
Payer: COMMERCIAL

## 2025-04-15 VITALS
DIASTOLIC BLOOD PRESSURE: 95 MMHG | OXYGEN SATURATION: 99 % | RESPIRATION RATE: 16 BRPM | SYSTOLIC BLOOD PRESSURE: 153 MMHG | HEART RATE: 71 BPM | TEMPERATURE: 97.88 F

## 2025-04-15 VITALS — WEIGHT: 185.19 LBS | BODY MASS INDEX: 27.43 KG/M2 | HEIGHT: 69 IN

## 2025-04-15 VITALS — OXYGEN SATURATION: 99 %

## 2025-04-15 DIAGNOSIS — I10: ICD-10-CM

## 2025-04-15 DIAGNOSIS — Z86.73: ICD-10-CM

## 2025-04-15 DIAGNOSIS — Z79.82: ICD-10-CM

## 2025-04-15 DIAGNOSIS — Z88.8: ICD-10-CM

## 2025-04-15 DIAGNOSIS — E11.649: Primary | ICD-10-CM

## 2025-04-15 DIAGNOSIS — Z79.84: ICD-10-CM

## 2025-04-15 LAB
ALBUMIN SERPL BCP-MCNC: 3.6 G/DL (ref 3.2–4.8)
ALT SERPL W P-5'-P-CCNC: 11 IU/L (ref 10–49)
APPEARANCE UR: CLEAR
AST SERPL W P-5'-P-CCNC: 19 IU/L (ref ?–34)
BASOPHILS NFR BLD AUTO: 0.5 % (ref 0–2)
BILIRUB DIRECT SERPL-MCNC: 0.2 MG/DL (ref ?–3)
BILIRUB SERPL-MCNC: 0.7 MG/DL (ref 0.1–1)
BUN SERPL-MCNC: 6 MG/DL (ref 9–23)
CALCIUM SERPL-MCNC: 10.6 MG/DL (ref 8.7–10.4)
CARDIAC TROPONIN I PNL SERPL HS: 5 NG/L (ref 3–53)
CHLORIDE SERPL-SCNC: 109 MEQ/L (ref 98–107)
CO2 SERPL-SCNC: 26 MEQ/L (ref 21–32)
COLOR UR: YELLOW
EOSINOPHIL NFR BLD AUTO: 4.6 % (ref 0–5)
ERYTHROCYTE [DISTWIDTH] IN BLOOD BY AUTOMATED COUNT: 12.6 % (ref 11.6–14.6)
GLUCOSE SERPL-MCNC: 110 MG/DL (ref 70–105)
GLUCOSE UR STRIP.AUTO-MCNC: NEGATIVE MG/DL
HCT VFR BLD AUTO: 41.1 % (ref 42–52)
HGB BLD-MCNC: 13.4 G/DL (ref 14–18)
HGB UR QL STRIP: NEGATIVE
KETONES UR STRIP-MCNC: NEGATIVE MG/DL
LEUKOCYTE ESTERASE UR QL STRIP: NEGATIVE
LYMPHOCYTES NFR BLD AUTO: 32.1 % (ref 20–50)
MANUAL DIF COMMENT BLD-IMP: 0
MCH RBC QN AUTO: 32.7 PG (ref 28–32)
MCHC RBC AUTO-ENTMCNC: 32.6 G/DL (ref 31–37)
MCV RBC AUTO: 100.1 FL (ref 80–94)
MONOCYTES NFR BLD AUTO: 10.8 % (ref 2–8)
NITRITE UR QL STRIP: NEGATIVE
PH UR STRIP: 7.5 [PH] (ref 4.5–8)
PLATELET # BLD AUTO: 141 X1000/UL (ref 130–400)
PMV BLD AUTO: 9.8 FL (ref 7.4–10.4)
POTASSIUM SERPL-SCNC: 3.9 MEQ/L (ref 3.5–5.1)
PROT SERPL-MCNC: 6.8 G/DL (ref 6–8.3)
PROT UR QL STRIP: NEGATIVE
PROTHROMBIN TIME: 10.9 SEC (ref 9.6–11)
SODIUM SERPL-SCNC: 141 MEQ/L (ref 136–145)
SP GR UR STRIP: 1 (ref 1–1.03)
UROBILINOGEN UR STRIP-MCNC: 0.2 E.U./DL (ref 0.2–1)
WBC # BLD: 5.3 X1000/UL (ref 4.5–11)

## 2025-04-15 PROCEDURE — 82962 GLUCOSE BLOOD TEST: CPT

## 2025-04-15 PROCEDURE — 36415 COLL VENOUS BLD VENIPUNCTURE: CPT

## 2025-04-15 PROCEDURE — 80076 HEPATIC FUNCTION PANEL: CPT

## 2025-04-15 PROCEDURE — 84484 ASSAY OF TROPONIN QUANT: CPT

## 2025-04-15 PROCEDURE — 81003 URINALYSIS AUTO W/O SCOPE: CPT

## 2025-04-15 PROCEDURE — 99284 EMERGENCY DEPT VISIT MOD MDM: CPT

## 2025-04-15 PROCEDURE — 83880 ASSAY OF NATRIURETIC PEPTIDE: CPT

## 2025-04-15 PROCEDURE — 80048 BASIC METABOLIC PNL TOTAL CA: CPT

## 2025-04-15 PROCEDURE — 85025 COMPLETE CBC W/AUTO DIFF WBC: CPT

## 2025-04-15 PROCEDURE — 71045 X-RAY EXAM CHEST 1 VIEW: CPT
